# Patient Record
Sex: FEMALE | Race: WHITE | NOT HISPANIC OR LATINO | Employment: PART TIME | URBAN - METROPOLITAN AREA
[De-identification: names, ages, dates, MRNs, and addresses within clinical notes are randomized per-mention and may not be internally consistent; named-entity substitution may affect disease eponyms.]

---

## 2017-02-21 ENCOUNTER — TRANSCRIBE ORDERS (OUTPATIENT)
Dept: ADMINISTRATIVE | Facility: HOSPITAL | Age: 62
End: 2017-02-21

## 2017-02-21 DIAGNOSIS — Z12.31 OTHER SCREENING MAMMOGRAM: Primary | ICD-10-CM

## 2017-04-19 ENCOUNTER — HOSPITAL ENCOUNTER (OUTPATIENT)
Dept: MAMMOGRAPHY | Facility: MEDICAL CENTER | Age: 62
Discharge: HOME | End: 2017-04-19
Payer: COMMERCIAL

## 2017-04-19 DIAGNOSIS — Z12.31 OTHER SCREENING MAMMOGRAM: ICD-10-CM

## 2017-04-19 PROCEDURE — G0202 SCR MAMMO BI INCL CAD: HCPCS

## 2017-04-24 ENCOUNTER — OFFICE VISIT (OUTPATIENT)
Dept: URGENT CARE | Facility: MEDICAL CENTER | Age: 62
End: 2017-04-24
Payer: COMMERCIAL

## 2017-04-24 ENCOUNTER — APPOINTMENT (OUTPATIENT)
Dept: LAB | Facility: HOSPITAL | Age: 62
End: 2017-04-24
Payer: COMMERCIAL

## 2017-04-24 DIAGNOSIS — R30.0 DYSURIA: ICD-10-CM

## 2017-04-24 PROCEDURE — G0382 LEV 3 HOSP TYPE B ED VISIT: HCPCS

## 2017-04-24 PROCEDURE — 81002 URINALYSIS NONAUTO W/O SCOPE: CPT

## 2017-04-24 PROCEDURE — 87086 URINE CULTURE/COLONY COUNT: CPT

## 2017-04-26 ENCOUNTER — HOSPITAL ENCOUNTER (OUTPATIENT)
Dept: MAMMOGRAPHY | Facility: CLINIC | Age: 62
Discharge: HOME | End: 2017-04-26
Payer: COMMERCIAL

## 2017-04-26 ENCOUNTER — HOSPITAL ENCOUNTER (OUTPATIENT)
Dept: ULTRASOUND IMAGING | Facility: CLINIC | Age: 62
Discharge: HOME | End: 2017-04-26
Payer: COMMERCIAL

## 2017-04-26 DIAGNOSIS — R92.8 ABNORMAL SCREENING MAMMOGRAM: ICD-10-CM

## 2017-04-26 LAB — BACTERIA UR CULT: NORMAL

## 2017-04-26 PROCEDURE — G0206 DX MAMMO INCL CAD UNI: HCPCS

## 2017-04-26 PROCEDURE — 76642 ULTRASOUND BREAST LIMITED: CPT

## 2017-04-26 RX ORDER — LOVASTATIN 10 MG/1
10 TABLET ORAL
COMMUNITY

## 2017-04-26 RX ORDER — LISINOPRIL 10 MG/1
10 TABLET ORAL DAILY
COMMUNITY

## 2017-05-10 ENCOUNTER — HOSPITAL ENCOUNTER (OUTPATIENT)
Dept: ULTRASOUND IMAGING | Facility: CLINIC | Age: 62
Discharge: HOME | End: 2017-05-10
Payer: COMMERCIAL

## 2017-05-10 ENCOUNTER — HOSPITAL ENCOUNTER (OUTPATIENT)
Dept: MAMMOGRAPHY | Facility: CLINIC | Age: 62
Discharge: HOME | End: 2017-05-10
Payer: COMMERCIAL

## 2017-05-10 ENCOUNTER — HOSPITAL ENCOUNTER (OUTPATIENT)
Dept: ULTRASOUND IMAGING | Facility: CLINIC | Age: 62
Discharge: HOME | End: 2017-05-10
Admitting: RADIOLOGY
Payer: COMMERCIAL

## 2017-05-10 VITALS — DIASTOLIC BLOOD PRESSURE: 82 MMHG | SYSTOLIC BLOOD PRESSURE: 140 MMHG | HEART RATE: 84 BPM

## 2017-05-10 DIAGNOSIS — R92.8 ABNORMAL FINDINGS ON DIAGNOSTIC IMAGING OF BREAST: ICD-10-CM

## 2017-05-10 DIAGNOSIS — R92.8 ABNORMAL SCREENING MAMMOGRAM: ICD-10-CM

## 2017-05-10 PROCEDURE — 88305 TISSUE EXAM BY PATHOLOGIST: CPT | Performed by: RADIOLOGY

## 2017-05-10 PROCEDURE — 19083 BX BREAST 1ST LESION US IMAG: CPT

## 2017-05-10 PROCEDURE — 88341 IMHCHEM/IMCYTCHM EA ADD ANTB: CPT | Performed by: RADIOLOGY

## 2017-05-10 PROCEDURE — 88342 IMHCHEM/IMCYTCHM 1ST ANTB: CPT | Performed by: RADIOLOGY

## 2017-05-10 RX ORDER — SODIUM BICARBONATE 42 MG/ML
1 INJECTION, SOLUTION INTRAVENOUS ONCE
Status: COMPLETED | OUTPATIENT
Start: 2017-05-10 | End: 2017-05-10

## 2017-05-10 RX ORDER — LIDOCAINE HYDROCHLORIDE 10 MG/ML
4 INJECTION, SOLUTION INFILTRATION; PERINEURAL ONCE
Status: COMPLETED | OUTPATIENT
Start: 2017-05-10 | End: 2017-05-10

## 2017-05-10 RX ADMIN — SODIUM BICARBONATE 0.5 MEQ: 42 SOLUTION INTRAVENOUS at 14:40

## 2017-05-10 RX ADMIN — LIDOCAINE HYDROCHLORIDE 4 ML: 10 INJECTION, SOLUTION INFILTRATION; PERINEURAL at 14:40

## 2017-07-08 ENCOUNTER — APPOINTMENT (OUTPATIENT)
Dept: LAB | Facility: HOSPITAL | Age: 62
End: 2017-07-08
Payer: COMMERCIAL

## 2017-07-08 ENCOUNTER — OFFICE VISIT (OUTPATIENT)
Dept: URGENT CARE | Facility: MEDICAL CENTER | Age: 62
End: 2017-07-08
Payer: COMMERCIAL

## 2017-07-08 DIAGNOSIS — R30.0 DYSURIA: ICD-10-CM

## 2017-07-08 PROCEDURE — G0382 LEV 3 HOSP TYPE B ED VISIT: HCPCS

## 2017-07-08 PROCEDURE — 87086 URINE CULTURE/COLONY COUNT: CPT

## 2017-07-08 PROCEDURE — 81002 URINALYSIS NONAUTO W/O SCOPE: CPT

## 2017-07-09 LAB — BACTERIA UR CULT: NORMAL

## 2018-08-31 ENCOUNTER — TRANSCRIBE ORDERS (OUTPATIENT)
Dept: ADMINISTRATIVE | Facility: HOSPITAL | Age: 63
End: 2018-08-31

## 2018-08-31 DIAGNOSIS — Z12.39 SCREENING BREAST EXAMINATION: Primary | ICD-10-CM

## 2018-09-26 ENCOUNTER — HOSPITAL ENCOUNTER (OUTPATIENT)
Dept: MAMMOGRAPHY | Facility: HOSPITAL | Age: 63
Discharge: HOME | End: 2018-09-26
Payer: COMMERCIAL

## 2018-09-26 DIAGNOSIS — Z12.39 SCREENING BREAST EXAMINATION: ICD-10-CM

## 2018-09-26 PROCEDURE — 77067 SCR MAMMO BI INCL CAD: CPT

## 2018-10-25 ENCOUNTER — OFFICE VISIT (OUTPATIENT)
Dept: URGENT CARE | Facility: MEDICAL CENTER | Age: 63
End: 2018-10-25
Payer: COMMERCIAL

## 2018-10-25 VITALS
BODY MASS INDEX: 28.47 KG/M2 | HEART RATE: 81 BPM | DIASTOLIC BLOOD PRESSURE: 88 MMHG | RESPIRATION RATE: 18 BRPM | SYSTOLIC BLOOD PRESSURE: 167 MMHG | OXYGEN SATURATION: 98 % | TEMPERATURE: 98 F | WEIGHT: 145 LBS | HEIGHT: 60 IN

## 2018-10-25 DIAGNOSIS — N39.0 URINARY TRACT INFECTION WITH HEMATURIA, SITE UNSPECIFIED: ICD-10-CM

## 2018-10-25 DIAGNOSIS — R35.0 URINARY FREQUENCY: Primary | ICD-10-CM

## 2018-10-25 DIAGNOSIS — R31.9 URINARY TRACT INFECTION WITH HEMATURIA, SITE UNSPECIFIED: ICD-10-CM

## 2018-10-25 LAB
SL AMB  POCT GLUCOSE, UA: NEGATIVE
SL AMB LEUKOCYTE ESTERASE,UA: ABNORMAL
SL AMB POCT BILIRUBIN,UA: NEGATIVE
SL AMB POCT BLOOD,UA: ABNORMAL
SL AMB POCT CLARITY,UA: CLEAR
SL AMB POCT COLOR,UA: ABNORMAL
SL AMB POCT KETONES,UA: NEGATIVE
SL AMB POCT NITRITE,UA: NEGATIVE
SL AMB POCT PH,UA: 6
SL AMB POCT SPECIFIC GRAVITY,UA: 1.02
SL AMB POCT URINE PROTEIN: 100
SL AMB POCT UROBILINOGEN: 0.2

## 2018-10-25 PROCEDURE — 87086 URINE CULTURE/COLONY COUNT: CPT | Performed by: NURSE PRACTITIONER

## 2018-10-25 PROCEDURE — 99214 OFFICE O/P EST MOD 30 MIN: CPT | Performed by: NURSE PRACTITIONER

## 2018-10-25 PROCEDURE — 81002 URINALYSIS NONAUTO W/O SCOPE: CPT | Performed by: NURSE PRACTITIONER

## 2018-10-25 RX ORDER — PHENAZOPYRIDINE HYDROCHLORIDE 100 MG/1
100 TABLET, FILM COATED ORAL 3 TIMES DAILY PRN
Qty: 10 TABLET | Refills: 0 | Status: SHIPPED | OUTPATIENT
Start: 2018-10-25 | End: 2019-06-12

## 2018-10-25 RX ORDER — NITROFURANTOIN 25; 75 MG/1; MG/1
100 CAPSULE ORAL 2 TIMES DAILY
Qty: 14 CAPSULE | Refills: 0 | Status: SHIPPED | OUTPATIENT
Start: 2018-10-25 | End: 2018-11-01

## 2018-10-25 NOTE — PATIENT INSTRUCTIONS
Maintain good hygiene  Encourage fluids and rest    May utilize Tylenol and Ibuprofen for discomfort  Complete course of antibiotics as prescribed  Be cautious pyridium will stain clothing and may change color of urine to orange  Await urine cultures  Follow up with PCP if symptoms persist      Dysuria   WHAT YOU NEED TO KNOW:   Dysuria is difficulty urinating, or pain, burning, or discomfort with urination  Dysuria is usually a symptom of another problem  DISCHARGE INSTRUCTIONS:   Return to the emergency department if:   · You have severe back, side, or abdominal pain  · You have fever and shaking chills  · You vomit several times in a row  Contact your healthcare provider if:   · Your symptoms do not go away, even after treatment  · You have questions or concerns about your condition or care  Medicines:   · Medicines  may be given to help treat a bacterial infection or help decrease bladder spasms  · Take your medicine as directed  Contact your healthcare provider if you think your medicine is not helping or if you have side effects  Tell him of her if you are allergic to any medicine  Keep a list of the medicines, vitamins, and herbs you take  Include the amounts, and when and why you take them  Bring the list or the pill bottles to follow-up visits  Carry your medicine list with you in case of an emergency  Follow up with your healthcare provider as directed: Your healthcare provider may also refer you to a urologist or nephrologist to have additional testing  Write down your questions so you remember to ask them during your visits  Manage your dysuria:   · Drink more liquids  Liquids help flush out bacteria that may be causing an infection  Ask your healthcare provider how much liquid to drink each day and which liquids are best for you  · Take sitz baths as directed  Fill a bathtub with 4 to 6 inches of warm water   You may also use a sitz bath pan that fits over a toilet  Sit in the sitz bath for 20 minutes  Do this 2 to 3 times a day, or as directed  The warm water can help decrease pain and swelling  © 2017 [employer] Information is for End User's use only and may not be sold, redistributed or otherwise used for commercial purposes  All illustrations and images included in CareNotes® are the copyrighted property of [employer]  or [employer]  The above information is an  only  It is not intended as medical advice for individual conditions or treatments  Talk to your doctor, nurse or pharmacist before following any medical regimen to see if it is safe and effective for you

## 2018-10-25 NOTE — PROGRESS NOTES
[facility]        NAME: Mary Anne Griffith is a 58 y o  female  : 1955    MRN: [id number]  DATE: 2018  TIME: 3:58 PM    Assessment and Plan   Urinary frequency [R35 0]  1  Urinary frequency  POCT urine dip    Urine culture   2  Urinary tract infection with hematuria, site unspecified  nitrofurantoin (MACROBID) 100 mg capsule    phenazopyridine (PYRIDIUM) 100 mg tablet         Patient Instructions   In office urinalysis + trace leuks, Large blood  Recommend course of antibiotics at this time  Maintain good hygiene  Encourage fluids and rest    May utilize Tylenol and Ibuprofen for discomfort  Complete course of antibiotics as prescribed  Be cautious pyridium will stain clothing and may change color of urine to orange  Await urine cultures  Follow up with PCP in 3-5 days  Proceed to  ER if symptoms worsen  Chief Complaint     Chief Complaint   Patient presents with    Possible UTI     bilateral lower back pain with urinary frequency and pain with urination         History of Present Illness       Patient presents in office with suspected UTI  Reports has had symptoms intermittently for past month including frequency, urgency, burning, decreased urine output  Had recently been seen by GYN though had a routine exam, no further testing  Denies fevers, chills, N/V/D  Review of Systems   Review of Systems   Constitutional: Negative for chills and fever  Respiratory: Negative  Cardiovascular: Negative  Gastrointestinal: Negative  Genitourinary: Positive for decreased urine volume, difficulty urinating, dysuria, frequency, hematuria, pelvic pain and urgency  Negative for flank pain, vaginal bleeding, vaginal discharge and vaginal pain  Musculoskeletal: Negative for myalgias  Skin: Negative for rash           Current Medications       Current Outpatient Prescriptions:     lisinopril (ZESTRIL) 10 mg tablet, Take 10 mg by mouth daily, Disp: , Rfl:     lovastatin (MEVACOR) 10 MG tablet, Take 10 mg by mouth daily at bedtime, Disp: , Rfl:     nitrofurantoin (MACROBID) 100 mg capsule, Take 1 capsule (100 mg total) by mouth 2 (two) times a day for 7 days, Disp: 14 capsule, Rfl: 0    phenazopyridine (PYRIDIUM) 100 mg tablet, Take 1 tablet (100 mg total) by mouth 3 (three) times a day as needed for bladder spasms, Disp: 10 tablet, Rfl: 0    Current Allergies     Allergies as of 10/25/2018    (No Known Allergies)            The following portions of the patient's history were reviewed and updated as appropriate: allergies, current medications, past family history, past medical history, past social history, past surgical history and problem list      Past Medical History:   Diagnosis Date    High cholesterol     Hypertension        Past Surgical History:   Procedure Laterality Date    APPENDECTOMY       SECTION      CHOLECYSTECTOMY      COSMETIC SURGERY         No family history on file  Medications have been verified  Objective   /88   Pulse 81   Temp 98 °F (36 7 °C) (Tympanic)   Resp 18   Ht 5' (1 524 m)   Wt 65 8 kg (145 lb)   SpO2 98%   BMI 28 32 kg/m²        Physical Exam     Physical Exam   Constitutional: She is oriented to person, place, and time  She appears well-developed and well-nourished  No distress  HENT:   Head: Normocephalic and atraumatic  Eyes: Pupils are equal, round, and reactive to light  Conjunctivae are normal    Cardiovascular: Normal rate, regular rhythm, normal heart sounds and intact distal pulses  No murmur heard  Pulmonary/Chest: Effort normal and breath sounds normal    Abdominal: Soft  Normal appearance and bowel sounds are normal  She exhibits no distension  There is no hepatosplenomegaly  There is tenderness in the suprapubic area  There is no rebound, no guarding and no CVA tenderness  Musculoskeletal: Normal range of motion  She exhibits no edema     Neurological: She is alert and oriented to person, place, and time  Skin: Skin is warm and dry  No rash noted  She is not diaphoretic  Psychiatric: She has a normal mood and affect  Nursing note and vitals reviewed  I have reviewed the student's history and physical, I have personally examined the patient and agree with the above stated findings and plan

## 2018-10-26 LAB — BACTERIA UR CULT: ABNORMAL

## 2018-10-27 ENCOUNTER — TELEPHONE (OUTPATIENT)
Dept: URGENT CARE | Facility: MEDICAL CENTER | Age: 63
End: 2018-10-27

## 2018-10-27 NOTE — TELEPHONE ENCOUNTER
Spoke with patient regarding urine culture result  She refers to symptomatic improvement  I informed her that it revealed mild urine infection and she is to complete course of antibiotic and Pyridium  If her symptoms return, she is to follow up with primary care provider  She expressed understanding

## 2019-06-12 ENCOUNTER — OFFICE VISIT (OUTPATIENT)
Dept: URGENT CARE | Facility: MEDICAL CENTER | Age: 64
End: 2019-06-12
Payer: COMMERCIAL

## 2019-06-12 VITALS
HEART RATE: 85 BPM | RESPIRATION RATE: 16 BRPM | HEIGHT: 60 IN | WEIGHT: 145 LBS | OXYGEN SATURATION: 98 % | DIASTOLIC BLOOD PRESSURE: 88 MMHG | BODY MASS INDEX: 28.47 KG/M2 | SYSTOLIC BLOOD PRESSURE: 178 MMHG | TEMPERATURE: 98.4 F

## 2019-06-12 DIAGNOSIS — N30.00 ACUTE CYSTITIS WITHOUT HEMATURIA: Primary | ICD-10-CM

## 2019-06-12 LAB
SL AMB POCT CLARITY,UA: CLEAR
SL AMB POCT COLOR,UA: ABNORMAL

## 2019-06-12 PROCEDURE — 99213 OFFICE O/P EST LOW 20 MIN: CPT | Performed by: PHYSICIAN ASSISTANT

## 2019-06-12 PROCEDURE — 87086 URINE CULTURE/COLONY COUNT: CPT | Performed by: PHYSICIAN ASSISTANT

## 2019-06-12 PROCEDURE — 81002 URINALYSIS NONAUTO W/O SCOPE: CPT | Performed by: PHYSICIAN ASSISTANT

## 2019-06-12 RX ORDER — PHENAZOPYRIDINE HYDROCHLORIDE 200 MG/1
200 TABLET, FILM COATED ORAL
Qty: 10 TABLET | Refills: 0 | Status: SHIPPED | OUTPATIENT
Start: 2019-06-12

## 2019-06-12 RX ORDER — CEPHALEXIN 500 MG/1
500 CAPSULE ORAL EVERY 8 HOURS SCHEDULED
Qty: 21 CAPSULE | Refills: 0 | Status: SHIPPED | OUTPATIENT
Start: 2019-06-12 | End: 2019-06-19

## 2019-06-14 LAB — BACTERIA UR CULT: NORMAL

## 2020-01-30 ENCOUNTER — TRANSCRIBE ORDERS (OUTPATIENT)
Dept: ADMINISTRATIVE | Facility: HOSPITAL | Age: 65
End: 2020-01-30

## 2020-01-30 DIAGNOSIS — Z12.31 ENCOUNTER FOR SCREENING MAMMOGRAM FOR MALIGNANT NEOPLASM OF BREAST: Primary | ICD-10-CM

## 2021-01-15 ENCOUNTER — HOSPITAL ENCOUNTER (OUTPATIENT)
Dept: MAMMOGRAPHY | Facility: MEDICAL CENTER | Age: 66
Discharge: HOME | End: 2021-01-15
Payer: COMMERCIAL

## 2021-01-15 VITALS — WEIGHT: 145 LBS | BODY MASS INDEX: 28.47 KG/M2 | HEIGHT: 60 IN

## 2021-01-15 DIAGNOSIS — Z12.31 ENCOUNTER FOR SCREENING MAMMOGRAM FOR MALIGNANT NEOPLASM OF BREAST: ICD-10-CM

## 2021-01-15 PROCEDURE — 77067 SCR MAMMO BI INCL CAD: CPT

## 2021-01-15 PROCEDURE — 77063 BREAST TOMOSYNTHESIS BI: CPT

## 2022-03-16 ENCOUNTER — HOSPITAL ENCOUNTER (OUTPATIENT)
Dept: MAMMOGRAPHY | Facility: MEDICAL CENTER | Age: 67
Discharge: HOME | End: 2022-03-16

## 2022-03-16 VITALS — HEIGHT: 60 IN | WEIGHT: 145.06 LBS | BODY MASS INDEX: 28.48 KG/M2

## 2022-03-16 DIAGNOSIS — Z12.31 ENCOUNTER FOR SCREENING MAMMOGRAM FOR MALIGNANT NEOPLASM OF BREAST: ICD-10-CM

## 2022-06-23 ENCOUNTER — EVALUATION (OUTPATIENT)
Dept: PHYSICAL THERAPY | Facility: CLINIC | Age: 67
End: 2022-06-23
Payer: COMMERCIAL

## 2022-06-23 DIAGNOSIS — M54.50 ACUTE BILATERAL LOW BACK PAIN WITHOUT SCIATICA: ICD-10-CM

## 2022-06-23 DIAGNOSIS — M54.2 NECK PAIN: Primary | ICD-10-CM

## 2022-06-23 PROCEDURE — 97112 NEUROMUSCULAR REEDUCATION: CPT | Performed by: PHYSICAL THERAPIST

## 2022-06-23 PROCEDURE — 97162 PT EVAL MOD COMPLEX 30 MIN: CPT | Performed by: PHYSICAL THERAPIST
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
## 2022-06-30 ENCOUNTER — APPOINTMENT (OUTPATIENT)
Dept: PHYSICAL THERAPY | Facility: CLINIC | Age: 67
End: 2022-06-30
Payer: COMMERCIAL

## 2022-07-06 ENCOUNTER — APPOINTMENT (OUTPATIENT)
Dept: PHYSICAL THERAPY | Facility: CLINIC | Age: 67
End: 2022-07-06
Payer: COMMERCIAL

## 2022-07-11 ENCOUNTER — OFFICE VISIT (OUTPATIENT)
Dept: PHYSICAL THERAPY | Facility: CLINIC | Age: 67
End: 2022-07-11
Payer: COMMERCIAL

## 2022-07-11 DIAGNOSIS — M54.2 NECK PAIN: ICD-10-CM

## 2022-07-11 DIAGNOSIS — M54.50 ACUTE BILATERAL LOW BACK PAIN WITHOUT SCIATICA: Primary | ICD-10-CM

## 2022-07-11 PROCEDURE — 97112 NEUROMUSCULAR REEDUCATION: CPT | Performed by: PHYSICAL THERAPIST

## 2022-07-11 PROCEDURE — 97140 MANUAL THERAPY 1/> REGIONS: CPT | Performed by: PHYSICAL THERAPIST

## 2022-07-11 PROCEDURE — 97110 THERAPEUTIC EXERCISES: CPT | Performed by: PHYSICAL THERAPIST

## 2022-07-11 NOTE — PROGRESS NOTES
Daily Note     Today's date: 2022  Patient name: Mikaela Pinto  : 1955  MRN: [id number]  Referring provider: NINI Castelan  Dx:   Encounter Diagnosis     ICD-10-CM    1  Acute bilateral low back pain without sciatica  M54 50    2  Neck pain  M54 2                   Subjective: Patient reports R sided neck pain is sore today and R shoulder  Objective: See treatment diary below      Assessment: Tolerated treatment well  Patient would benefit from continued PT  Treatment plan initiated  Focus was on R side of her neck due to it being most painful this visit  Decrease in pain post     Plan: Progress treatment as tolerated         Precautions: none       Manuals            L scalene STM  [address] MK           B UT/LS STM  [address]           [address] MK           Low back/glute STM B  [address]            R shoulder posterior cuff  STM  [address]           Neuro Re-Ed             Chin tucks r 12x 5" holds            scap 4 Low rows, rows 2x10 grn, 20x 3" holds blue                         Core and glute   nv                                                 Ther Ex             SKTC r            sidelying shoulder ER r 3x10            LS stretch B r                         Treadmill/bike/UBE  UBE 4 min            Seated lumbar flexion w/ tball  10x 5" holds            Self first rib mob  10x 5" holds                         Ther Activity                                       Gait Training                                       Modalities

## 2022-07-13 ENCOUNTER — OFFICE VISIT (OUTPATIENT)
Dept: PHYSICAL THERAPY | Facility: CLINIC | Age: 67
End: 2022-07-13
Payer: COMMERCIAL

## 2022-07-13 DIAGNOSIS — M54.2 NECK PAIN: ICD-10-CM

## 2022-07-13 DIAGNOSIS — M54.50 ACUTE BILATERAL LOW BACK PAIN WITHOUT SCIATICA: Primary | ICD-10-CM

## 2022-07-13 PROCEDURE — 97140 MANUAL THERAPY 1/> REGIONS: CPT | Performed by: PHYSICAL THERAPIST

## 2022-07-13 PROCEDURE — 97112 NEUROMUSCULAR REEDUCATION: CPT | Performed by: PHYSICAL THERAPIST

## 2022-07-13 PROCEDURE — 97110 THERAPEUTIC EXERCISES: CPT | Performed by: PHYSICAL THERAPIST

## 2022-07-13 NOTE — PROGRESS NOTES
Daily Note     Today's date: 2022  Patient name: Rocio Navarro  : 1955  MRN: [id number]  Referring provider: NINI Bernardo  Dx:   Encounter Diagnosis     ICD-10-CM    1  Acute bilateral low back pain without sciatica  M54 50    2  Neck pain  M54 2                   Subjective: Patient reports neck was sore after last visit but not for too long and went to reach for something with her R shoulder and felt pain in shoulder  Objective: See treatment diary below      Assessment: Tolerated treatment well  Patient would benefit from continued PT  Treatment plan progressed with more shoulder strengthening/stabilization added  Focus was on R side of her neck due to it being most painful this visit  Decrease in pain post     Plan: Progress treatment as tolerated         Precautions: none       Manuals           L scalene STM [address]          B UT/LS STM [address]          [address]          Low back/glute STM B [address]            R shoulder posterior cuff  STM [address] STM [address]           Neuro Re-Ed             Chin tucks r 12x 5" holds  15x 5" holds           scap 4 Low rows, rows 2x10 grn, 20x 3" holds blue  2x12 grn, 20x 3" holds blue           Supine serratus punches   2x10 R          Core and glute   nv          Wall slides   12x                                     Ther Ex             SKTC r            sidelying shoulder ER r 3x10  3x10           LS stretch B r                         Treadmill/bike/UBE  UBE 4 min  UBE 4 min           Seated lumbar flexion w/ tball  10x 5" holds  10x 5" holds           Self first rib mob  10x 5" holds            Supine thoracic ext over hor   2 min           Ther Activity                                       Gait Training                                       Modalities

## 2022-07-20 ENCOUNTER — APPOINTMENT (OUTPATIENT)
Dept: PHYSICAL THERAPY | Facility: CLINIC | Age: 67
End: 2022-07-20
Payer: COMMERCIAL

## 2022-07-22 ENCOUNTER — OFFICE VISIT (OUTPATIENT)
Dept: PHYSICAL THERAPY | Facility: CLINIC | Age: 67
End: 2022-07-22
Payer: COMMERCIAL

## 2022-07-22 DIAGNOSIS — M54.50 ACUTE BILATERAL LOW BACK PAIN WITHOUT SCIATICA: Primary | ICD-10-CM

## 2022-07-22 DIAGNOSIS — M54.2 NECK PAIN: ICD-10-CM

## 2022-07-22 PROCEDURE — 97140 MANUAL THERAPY 1/> REGIONS: CPT | Performed by: PHYSICAL THERAPIST

## 2022-07-22 PROCEDURE — 97110 THERAPEUTIC EXERCISES: CPT | Performed by: PHYSICAL THERAPIST

## 2022-07-22 PROCEDURE — 97112 NEUROMUSCULAR REEDUCATION: CPT | Performed by: PHYSICAL THERAPIST

## 2023-02-07 ENCOUNTER — HOSPITAL ENCOUNTER (OUTPATIENT)
Dept: MAMMOGRAPHY | Facility: MEDICAL CENTER | Age: 68
Discharge: HOME | End: 2023-02-07

## 2023-02-07 VITALS — HEIGHT: 60 IN | BODY MASS INDEX: 28.47 KG/M2 | WEIGHT: 145 LBS

## 2025-07-10 NOTE — PROGRESS NOTES
Additional ordered.   Daily Note     Today's date: 2022  Patient name: Lavera Koyanagi  : 1955  MRN: [id number]  Referring provider: NINI Beck  Dx:   Encounter Diagnosis     ICD-10-CM    1  Acute bilateral low back pain without sciatica  M54 50    2  Neck pain  M54 2        Start Time: 8495  Stop Time: 1037  Total time in clinic (min): 42 minutes    Subjective: Patient reports that she has not been feeling much discomfort since she has last been to physical therapy  Pt notes her shoulder and neck are both feeling better  Objective: See treatment diary below      Assessment: Tolerated treatment well  Patient would benefit from continued PT  Pt reports she feels less tender overall throughout her neck/ R shoulder and has less locations that feel tender when manual therapy is being performed  Plan: Progress treatment as tolerated         Precautions: none       Manuals          L scalene STM [address] [facility] [address]         B UT[address]LS  [address]         [address]         Low back/glute STM B [address]            R shoulder posterior cuff   [address] STM [address]  STM AB         Neuro Re-Ed             Chin tucks r 12x 5" holds  15x 5" holds  15x 5" holds          scap 4 Low rows, rows 2x10 grn, 20x 3" holds blue  2x12 grn, 20x 3" holds blue  2x12 grn, 20x 3" holds blue          Supine serratus punches   2x10 R 2x12 R         Core and glute   nv          Wall slides   12x  12x                                   Ther Ex             SKTC r            sidelying shoulder ER r 3x10  3x10  3x10         LS stretch B r                         Treadmill/bike/UBE  UBE 4 min  UBE 4 min  UBE 4 min         Seated lumbar flexion w/ tball  10x 5" holds  10x 5" holds  15x 5" holds          Self first rib mob  10x 5" holds            Supine thoracic ext over hor   2 min  2 min         Ther Activity                                       Gait Training                                       Modalities